# Patient Record
(demographics unavailable — no encounter records)

---

## 2025-02-13 NOTE — IMAGING
[de-identified] : right knee No effusion, no warmth, no ecchymosis, no erythema. lateral joint ttp Range of motion 3-120 without pain 5/5 quadriceps and hamstring strength Negative Lachman, negative Mare, negative anterior drawer, negative posterior drawer, negative patella apprehension; no extensor lag: no varus or valgus instability. Motor and sensory intact distally Negative Thais mildly antalgic gait  right hip No swelling, no ecchymosis, no deformity. greater troch ttp good range of motion with no pain 5/5 hip flexion, extension abduction and adduction Negative impingement; no groin pain with hip rotation Motor and sensory intact distally +2 DP and PT pulses mildly antalgic gait  lumbar No swelling, no ecchymosis No lumbar tenderness to palpation 5/5 Motor exam; no focal deficits. Negative straight leg raise No ankle clonus Negative Thais Reflexes +2, intact sensory exam distally mildly antalgic gait [Severe arthritis (Tonnis Grade 3)] : Severe arthritis (Tonnis Grade 3) [Right] : right knee [AP] : anteroposterior [Lateral] : lateral [[place]] : [place] [There are no fractures, subluxations or dislocations. No significant abnormalities are seen] : There are no fractures, subluxations or dislocations. No significant abnormalities are seen [Moderate tricompartmental OA medial narrowing] : Moderate tricompartmental OA medial narrowing [Moderate tricompartmental OA lateral narrowing] : Moderate tricompartmental OA lateral narrowing

## 2025-02-13 NOTE — HISTORY OF PRESENT ILLNESS
[10] : 10 [Localized] : localized [Radiating] : radiating [Sharp] : sharp [Constant] : constant [Meds] : meds [Injection therapy] : injection therapy [Retired] : Work status: retired [] : no [FreeTextEntry1] : right knee, right hip  [FreeTextEntry3] : 2/13/25 [FreeTextEntry5] : NKI, Patient reports he woke up with new hip pain, but has had constant knee pain prior. patient had gel injections in the right knee ~1 year ago in [place]  [FreeTextEntry7] : radiating from right hip to right knee  [FreeTextEntry9] : gel, tylenol, voltaren  [de-identified] : any movement  [de-identified] : ~1 year ago [de-identified] : none

## 2025-02-13 NOTE — ASSESSMENT
[FreeTextEntry1] : 82 yo M with right knee and lateral hip pain. H/o right knee OA, euflexxa last year with some relief. Pain worsening recently and new onset lateral hip pain this morning. No known injury. no LBP, no radicular symptoms.  discussed cons mgmt - rest, ice, tylenol prn voltaren for right hip CSI discussed and done today for right knee, tolerated well physical therapy discussed, patient declined euflexx auth requested rto p auth with knee specialist

## 2025-02-13 NOTE — PROCEDURE
[FreeTextEntry3] :  - Large joint injection was performed of the right knee.  - The indication for this procedure was pain and inflammation. Patient has tried OTC's including aspirin, Ibuprofen, Aleve, etc or prescription NSAIDS, and/or exercises at home and/or physical therapy without satisfactory response, patient had decreased mobility in the joint and the risks benefits, and alternatives have been discussed, and verbal consent was obtained. The site was prepped with alcohol, betadine, ethyl chloride sprayed topically and sterile technique used. - An injection of Betamethasone 2cc; Marcaine 5cc injected. - Patient was advised to call if redness, pain or fever occur, apply ice for 15 minutes out of every hour for the next 12-24 hours as tolerated and patient was advised to rest the joint(s) for 2 days.

## 2025-02-27 NOTE — HISTORY OF PRESENT ILLNESS
[Burning] : burning [Dull/Aching] : dull/aching [Sharp] : sharp [Tightness] : tightness [Constant] : constant [Retired] : Work status: retired [de-identified] : 83M p/w R hip and knee pain. Woke up in pain 2 weeks ago, seen at  had CSI in his right knee which hasnt helped much. [] : no [FreeTextEntry5] : pain in r hip is feeling a little better, r knee pain is constant [FreeTextEntry9] : tylenol, bengay cream [de-identified] : sit/stand motion  [de-identified] : [facility] [de-identified] : [facility]

## 2025-02-27 NOTE — ASSESSMENT
[FreeTextEntry1] : 83M p/w R hip and knee OA  Discussed his knee symptoms may be referred from the hip R knee euflexxa tolerated well Begin PT return 1 week   The risks, benefits, contents and alternatives to injection were explained in full to the patient. Risks outlined include but are not limited to infection, sepsis, bleeding, scarring, skin discoloration, temporary increase in pain, syncopal episode, failure to resolve symptoms, allergic reaction, flare reaction, permanent white skin discoloration, symptom recurrence, and elevation of blood sugar in diabetics. Patient understood the risks. All questions were answered. After discussion of options, patient requested an injection. Oral informed consent was obtained and sterile prep was done of the injection site. Sterile technique was used to introduce the mixture. Patient tolerated the procedure well. Patient advised to ice the injection site this evening. Signs and symptoms of infection reviewed and patient advised to call immediately for redness, fevers, and/or chills.

## 2025-02-27 NOTE — PROCEDURE
[Large Joint Injection] : Large joint injection [Right] : of the right [Knee] : knee [X-ray evidence of Osteoarthritis on this or prior visit] : x-ray evidence of Osteoarthritis on this or prior visit [Alcohol] : alcohol [Ethyl Chloride sprayed topically] : ethyl chloride sprayed topically [Euflexxa(20mg)] : 20mg of Euflexxa [#1] : series #1 [Call if redness, pain or fever occur] : call if redness, pain or fever occur [Apply ice for 15min out of every hour for the next 12-24 hours as tolerated] : apply ice for 15 minutes out of every hour for the next 12-24 hours as tolerated [Previous OTC use and PT nontherapeutic] : patient has tried OTC's including aspirin, Ibuprofen, Aleve, etc or prescription NSAIDS, and/or exercises at home and/or physical therapy without satisfactory response [Risks, benefits, alternatives discussed / Verbal consent obtained] : the risks benefits, and alternatives have been discussed, and verbal consent was obtained [Prior failure or difficult injection] : prior failure or difficult injection

## 2025-03-06 NOTE — PROCEDURE
[Large Joint Injection] : Large joint injection [Right] : of the right [Knee] : knee [X-ray evidence of Osteoarthritis on this or prior visit] : x-ray evidence of Osteoarthritis on this or prior visit [Alcohol] : alcohol [Ethyl Chloride sprayed topically] : ethyl chloride sprayed topically [Euflexxa(20mg)] : 20mg of Euflexxa [#2] : series #2 [Call if redness, pain or fever occur] : call if redness, pain or fever occur [Apply ice for 15min out of every hour for the next 12-24 hours as tolerated] : apply ice for 15 minutes out of every hour for the next 12-24 hours as tolerated [Previous OTC use and PT nontherapeutic] : patient has tried OTC's including aspirin, Ibuprofen, Aleve, etc or prescription NSAIDS, and/or exercises at home and/or physical therapy without satisfactory response [Risks, benefits, alternatives discussed / Verbal consent obtained] : the risks benefits, and alternatives have been discussed, and verbal consent was obtained [Prior failure or difficult injection] : prior failure or difficult injection

## 2025-03-06 NOTE — ASSESSMENT
[FreeTextEntry1] : 83M p/w R hip and knee OA  Discussed his knee symptoms may be referred from the hip R knee euflexxa tolerated well Cont PT return 1 week   The risks, benefits, contents and alternatives to injection were explained in full to the patient. Risks outlined include but are not limited to infection, sepsis, bleeding, scarring, skin discoloration, temporary increase in pain, syncopal episode, failure to resolve symptoms, allergic reaction, flare reaction, permanent white skin discoloration, symptom recurrence, and elevation of blood sugar in diabetics. Patient understood the risks. All questions were answered. After discussion of options, patient requested an injection. Oral informed consent was obtained and sterile prep was done of the injection site. Sterile technique was used to introduce the mixture. Patient tolerated the procedure well. Patient advised to ice the injection site this evening. Signs and symptoms of infection reviewed and patient advised to call immediately for redness, fevers, and/or chills.

## 2025-03-06 NOTE — HISTORY OF PRESENT ILLNESS
[Dull/Aching] : dull/aching [Constant] : constant [Retired] : Work status: retired [6] : 6 [Localized] : localized [Injection therapy] : injection therapy [2] : 2 [Euflexxa] : Euflexxa [de-identified] : 83M p/w R hip and knee pain. Woke up in pain 2 weeks ago, seen at  had CSI in his right knee which hasnt helped much.  3/6/25: Here for Euflexxa #2 R knee [] : no [FreeTextEntry1] : rt knee today [FreeTextEntry5] : pain in r hip is feeling a little better, r knee pain is constant [de-identified] : sit/stand motion  [de-identified] : 2-27-94 [de-identified] : rt knee

## 2025-03-13 NOTE — DISCUSSION/SUMMARY
[de-identified] : (Impression) -right knee osteoarthritis    The diagnosis was explained in detail. The potential non-surgical and surgical treatments were reviewed. The relative risks and benefits of each option were considered relative to the patient age, activity level, medical history, symptom severity and previously attempted treatments.  The patient was advised to consult with their primary medical provider prior to initiation of any new medications to reduce the risk of adverse effects specific to their long-term home medications and medical history. The risk of gastrointestinal irritation and kidney injury specific to long-term NSAID use was discussed.    (Plan)  -Hyaluronic acid injection will be performed for symptom relief. -Over the counter acetaminophen for pain, take as needed. -Follow up as needed.     (MDM) Problem Complexity -Moderate: chronic illness with exacerbation   Risk -Moderate: injection   Visit Type -Established     (Procedure: Hyaluronic Acid Injection)   Injection location was the: -right knee joint   Injection contents were: -Euflexxa, 2 mL   Procedure Details: -Informed consent was obtained. Discussed possible risks of hyaluronic acid injection including infection and local inflammatory reaction. -Discussed that due to infection risk, an interval between injection and any future surgery is 6 weeks for arthroscopy and 3 months for arthroplasty. -Injection performed using aseptic technique. Hemostasis was confirmed. -Procedure was tolerated well with no complications.

## 2025-03-13 NOTE — HISTORY OF PRESENT ILLNESS
[de-identified] : 03/13/2025: Patient is here today for the right knee. patient of Dr. Lagos, here for Euflexxa #3, moderate relief so far.

## 2025-03-28 NOTE — ASSESSMENT
[FreeTextEntry1] : 83M p/w R hip and knee OA, severe exacerbation  He would like to proceed with R MAXI, r b a explained, we will call to schedule  We discussed my findings and the natural history of their condition. We talked about the details of the proposed surgery and the recovery. We discussed the material risks, possible benefits and alternatives to surgery. The risks include but are not limited to infection, bleeding and possible need for blood transfusion, fracture, bowel blockage, bladder retention or infection, need for reoperation, stiffness and/or limited range of motion, possible damage to nerves and blood vessels, failure of fixation of components, risk of deep vein thromboses and pulmonary embolism, wound healing problems, dislocation, and possible leg length discrepancy. Although incredibly rare, we also discussed the risks of a cardiac event, stroke and even death during, or following, the surgery. We discussed the type of implants the patient will be receiving and the type of fixation that will be used, as well as whether a robot or computer navigation aide will be used. The patient understands they will need medical clearance and will attend a preoperative joint education class. We also discussed the type of anesthesia they will receive, and the risks associated with hospital or rehab length of stay, obesity, diabetes and smoking.

## 2025-03-28 NOTE — PHYSICAL EXAM
[2+] : dorsalis pedis pulse: 2+ [] : antalgic [Right] : right hip with pelvis [Severe arthritis (Tonnis Grade 3)] : Severe arthritis (Tonnis Grade 3)

## 2025-03-28 NOTE — HISTORY OF PRESENT ILLNESS
[Burning] : burning [Dull/Aching] : dull/aching [Sharp] : sharp [Tightness] : tightness [Constant] : constant [Retired] : Work status: retired [de-identified] : 83M p/w R hip and knee pain. Woke up in pain 2 weeks ago, seen at  had CSI in his right knee which hasnt helped much.  3/28/25: Patient is here today for f/u on R hip, reports he still has increased pain within the area. [] : no [FreeTextEntry5] : pain in r hip is feeling a little better, r knee pain is constant [FreeTextEntry9] : tylenol, bengay cream [de-identified] : sit/stand motion  [de-identified] : [facility] [de-identified] : [facility]

## 2025-04-09 NOTE — HISTORY OF PRESENT ILLNESS
[FreeTextEntry1] : 82 y/o M with PMH HTN, HLD, BPH presents for initial visit. He is planned for a R hip replacement this month and is requesting perioperative risk stratification. Patient denies cardiac history, states he was offered a "routine" cardiac cath with his prior cardiologist in [place] and declined, was placed on plavix for cardiac risk reduction. Reports a normal stress test 1 year ago. He denies any chest pain, dyspnea, palpitations, dizziness, lightheadedness, LE edema, syncope or near syncope. Limited effort tolerance due to hip pain.  Family Hx: denies premature CAD or sudden cardiac death  Social Hx: former smoker, 1 ppd x 40  years, quit 3 years ago; drinks 1 beer daily; denies illicit drug use

## 2025-04-09 NOTE — DISCUSSION/SUMMARY
[FreeTextEntry1] : 84 y/o M with PMH HTN, HLD, BPH presents for initial visit. He is planned for a R hip replacement this month and is requesting perioperative risk stratification.   #Preop cardiac evaluation  Limited effort tolerance due to hip pain  Patient denies cardiac history, however prior cardiologist was in [place] and no records are available for review. Will request records  TTE ordered to assess baseline LV function; risk stratification pending results No indication for plavix; can switch to ASA 81mg daily   #HTN Uncontrolled Reports compliance with medications  Add losartan 25mg daily  DASH diet   #HLD  On crestor 20mg daily  Repeat lipid panel ordered [EKG obtained to assist in diagnosis and management of assessed problem(s)] : EKG obtained to assist in diagnosis and management of assessed problem(s)

## 2025-05-22 NOTE — ASSESSMENT
[FreeTextEntry1] : 83M s/p RT MAXI and knee OA  XRs reviewed and discussed with patient Continue PT follow up in 4 weeks    We discussed the patient's progress. The patient was reminded of their hip dislocation precautions and their antibiotic prophylaxis. We discussed continued physical therapy and/or a home exercise program. Questions about their hip replacement and future follow up were answered and discussed.

## 2025-05-22 NOTE — HISTORY OF PRESENT ILLNESS
[Dull/Aching] : dull/aching [Constant] : constant [Retired] : Work status: retired [de-identified] : 83M p/w R hip and knee pain. Woke up in pain 2 weeks ago, seen at  had CSI in his right knee which hasnt helped much.  3/28/25: Patient is here today for f/u on R hip, reports he still has increased pain within the area.  5/22/25: Patient here for POV #1. Patient doing well. Ambulating with cane. Attending first session of PT yesterday. [] : no [FreeTextEntry5] : pain in r hip is feeling a little better, r knee pain is constant [FreeTextEntry6] : numbness [FreeTextEntry7] : rt thigh [FreeTextEntry9] : tylenol, bengay cream [de-identified] : sit/stand motion  [de-identified] : [facility] [de-identified] : [facility]

## 2025-05-22 NOTE — PHYSICAL EXAM
[2+] : dorsalis pedis pulse: 2+ [Severe arthritis (Tonnis Grade 3)] : Severe arthritis (Tonnis Grade 3) [Right] : right knee [] : lateral joint line tenderness

## 2025-06-04 NOTE — CARDIOLOGY SUMMARY
[de-identified] : SR, 1st degree AV block  [de-identified] : 4/10/25: 1. Technically difficult image quality. 2. Left ventricular cavity is normal in size. Left ventricular systolic function is normal with an ejection fraction visually estimated at 55 to 60 %. 3. Normal left ventricular diastolic function. 4. Left atrium is normal in size. 5. Normal right ventricular cavity size and normal right ventricular systolic function. 6. The right atrium is normal in size. 7. No significant valvular disease. 8. Estimated pulmonary artery systolic pressure is 18 mmHg, consistent with normal pulmonary artery pressure. 9. No pericardial effusion seen. 10. No prior echocardiogram is available for comparison.

## 2025-06-04 NOTE — HISTORY OF PRESENT ILLNESS
[FreeTextEntry1] : 84 y/o M with PMH HTN, HLD, BPH was seen by me 4/9/25 for initial visit prior to R hip replacement. He previously followed with a cardiologist in [place]; was hospitalized in 2020 with sepsis and was found to have elevated troponins likely 2/2 demand ischemia. He underwent NST in 2020 with inferior/septal mild ischemia and stress induced AF, was offered LHC but refused. He had a more recent NST in 5/2023 which was grossly normal.   Patient presents today for follow up. He currently feels well. Underwent hip replacement and states that he is ambulating better. He denies any chest pain, dyspnea, palpitations, dizziness, lightheadedness, LE edema, syncope or near syncope. Family Hx: denies premature CAD or sudden cardiac death  Social Hx: former smoker, 1 ppd x 40  years, quit 3 years ago; drinks 1 beer daily; denies illicit drug use

## 2025-06-04 NOTE — PHYSICAL EXAM
[Well Developed] : well developed [Well Nourished] : well nourished [No Acute Distress] : no acute distress [No Carotid Bruit] : no carotid bruit [Normal S1, S2] : normal S1, S2 [No Murmur] : no murmur [No Rub] : no rub [No Gallop] : no gallop [Clear Lung Fields] : clear lung fields [Good Air Entry] : good air entry [No Respiratory Distress] : no respiratory distress  [Soft] : abdomen soft [Non Tender] : non-tender [No Masses/organomegaly] : no masses/organomegaly [Normal Bowel Sounds] : normal bowel sounds [No Edema] : no edema [No Cyanosis] : no cyanosis [No Clubbing] : no clubbing [No Varicosities] : no varicosities [Moves all extremities] : moves all extremities [No Focal Deficits] : no focal deficits [Normal Speech] : normal speech [Alert and Oriented] : alert and oriented [Normal memory] : normal memory [de-identified] : Ambulates with cane

## 2025-06-04 NOTE — ADDENDUM
[FreeTextEntry1] : Records from prior cardiologist in [place] were reviewed. Patient was hospitalized in 2020 with sepsis and was found to have elevated troponins, underwent NST with inferior/septal mild ischemia and stress induced AF, was offered LHC but refused. He had a more recent NST in 5/2023 which was grossly normal. TTE 4/10/25 showed LVEF 55-60%, no significant valvular disease. Patient is acceptable cardiac risk for planned procedure and is optimized from cardiac perspective. No further cardiac workup needed prior to hip replacement.

## 2025-06-04 NOTE — DISCUSSION/SUMMARY
[FreeTextEntry1] : 82 y/o M with PMH HTN, HLD, BPH presents for follow up.   #HTN Controlled Continue losartan, coreg  DASH diet   #HLD  On crestor 20mg daily  Repeat lipid panel ordered Check carotid duplex

## 2025-06-19 NOTE — ASSESSMENT
[FreeTextEntry1] : 83M s/p RT MAXI and knee OA  Continue HEP follow up in 10 weeks    We discussed the patient's progress. The patient was reminded of their hip dislocation precautions and their antibiotic prophylaxis. We discussed continued physical therapy and/or a home exercise program. Questions about their hip replacement and future follow up were answered and discussed.

## 2025-06-19 NOTE — HISTORY OF PRESENT ILLNESS
[6] : 6 [0] : 0 [Retired] : Work status: retired [de-identified] : 83M p/w R hip and knee pain. Woke up in pain 2 weeks ago, seen at  had CSI in his right knee which hasnt helped much.  3/28/25: Patient is here today for f/u on R hip, reports he still has increased pain within the area.  5/22/25: Patient here for POV #1. Patient doing well. Ambulating with cane. Attending first session of PT yesterday.  06/1925 PO2 doing well minimal pain, denies n/v/f/c [de-identified] : p/t